# Patient Record
Sex: FEMALE | Race: WHITE | Employment: OTHER
[De-identification: names, ages, dates, MRNs, and addresses within clinical notes are randomized per-mention and may not be internally consistent; named-entity substitution may affect disease eponyms.]

---

## 2022-08-30 ENCOUNTER — TELEPHONE (OUTPATIENT)
Dept: INTERNAL MEDICINE CLINIC | Facility: CLINIC | Age: 87
End: 2022-08-30

## 2022-08-30 NOTE — TELEPHONE ENCOUNTER
Spoke with patient and advised to go to urgent care if having issues with medications another Dr gave her/TD

## 2022-08-30 NOTE — TELEPHONE ENCOUNTER
----- Message from Cornel Truongcecille sent at 8/30/2022  8:40 AM EDT -----  Subject: Message to Provider    QUESTIONS  Information for Provider? Had a telehealth appointment and had to   reschedule new patient appointment, she was given a script for cephalexin   500mgs, but is having side effects of loose bowels, please contact to   advise if able.   ---------------------------------------------------------------------------  --------------  Marek FRANCO  [phone]; OK to leave message on voicemail  ---------------------------------------------------------------------------  --------------  SCRIPT ANSWERS  Relationship to Patient?  Self